# Patient Record
Sex: FEMALE | Race: WHITE | NOT HISPANIC OR LATINO | URBAN - METROPOLITAN AREA
[De-identification: names, ages, dates, MRNs, and addresses within clinical notes are randomized per-mention and may not be internally consistent; named-entity substitution may affect disease eponyms.]

---

## 2017-09-29 ENCOUNTER — TRANSFERRED RECORDS (OUTPATIENT)
Dept: HEALTH INFORMATION MANAGEMENT | Facility: CLINIC | Age: 72
End: 2017-09-29

## 2017-10-13 ENCOUNTER — OFFICE VISIT (OUTPATIENT)
Dept: OPHTHALMOLOGY | Facility: CLINIC | Age: 72
End: 2017-10-13

## 2017-10-13 DIAGNOSIS — H40.51X3 NVG (NEOVASCULAR GLAUCOMA), RIGHT, SEVERE STAGE: Primary | ICD-10-CM

## 2017-10-13 DIAGNOSIS — Z96.1 PSEUDOPHAKIA OF BOTH EYES: ICD-10-CM

## 2017-10-13 DIAGNOSIS — H20.9 UVEITIS: ICD-10-CM

## 2017-10-13 RX ORDER — PREDNISOLONE ACETATE 10 MG/ML
1 SUSPENSION/ DROPS OPHTHALMIC 4 TIMES DAILY
Qty: 1 BOTTLE | Refills: 3 | Status: SHIPPED | OUTPATIENT
Start: 2017-10-13

## 2017-10-13 RX ORDER — SIMVASTATIN 40 MG
40 TABLET ORAL
COMMUNITY
Start: 2017-08-21

## 2017-10-13 RX ORDER — DORZOLAMIDE HYDROCHLORIDE AND TIMOLOL MALEATE 20; 5 MG/ML; MG/ML
1 SOLUTION/ DROPS OPHTHALMIC 2 TIMES DAILY
Qty: 1 BOTTLE | Refills: 11 | Status: SHIPPED | OUTPATIENT
Start: 2017-10-13

## 2017-10-13 RX ORDER — LORAZEPAM 0.5 MG/1
0.5 TABLET ORAL
COMMUNITY
Start: 2016-02-17

## 2017-10-13 RX ORDER — BRIMONIDINE TARTRATE 2 MG/ML
1 SOLUTION/ DROPS OPHTHALMIC 3 TIMES DAILY
Qty: 15 ML | Refills: 11 | Status: SHIPPED | OUTPATIENT
Start: 2017-10-13

## 2017-10-13 RX ORDER — GLIPIZIDE 10 MG/1
10 TABLET, FILM COATED, EXTENDED RELEASE ORAL
COMMUNITY
Start: 2017-06-12

## 2017-10-13 RX ORDER — LATANOPROST 50 UG/ML
1 SOLUTION/ DROPS OPHTHALMIC AT BEDTIME
Qty: 1 BOTTLE | Refills: 11 | Status: SHIPPED | OUTPATIENT
Start: 2017-10-13

## 2017-10-13 RX ORDER — LISINOPRIL 2.5 MG/1
TABLET ORAL
COMMUNITY
Start: 2017-09-01

## 2017-10-13 RX ORDER — PREDNISOLONE ACETATE 10 MG/ML
1 SUSPENSION/ DROPS OPHTHALMIC 2 TIMES DAILY
COMMUNITY
Start: 2017-10-06

## 2017-10-13 ASSESSMENT — SLIT LAMP EXAM - LIDS
COMMENTS: NORMAL
COMMENTS: NORMAL

## 2017-10-13 ASSESSMENT — VISUAL ACUITY
OD_CC: 20/125-
METHOD: SNELLEN - LINEAR
OS_CC: 20/30-2

## 2017-10-13 ASSESSMENT — EXTERNAL EXAM - RIGHT EYE: OD_EXAM: NORMAL

## 2017-10-13 ASSESSMENT — TONOMETRY
IOP_METHOD: APPLANATION
OS_IOP_MMHG: 18
OD_IOP_MMHG: 19

## 2017-10-13 ASSESSMENT — EXTERNAL EXAM - LEFT EYE: OS_EXAM: NORMAL

## 2017-10-13 ASSESSMENT — CONF VISUAL FIELD
OD_SUPERIOR_NASAL_RESTRICTION: 1
OD_INFERIOR_TEMPORAL_RESTRICTION: 3
OS_NORMAL: 1

## 2017-10-13 ASSESSMENT — CUP TO DISC RATIO
OS_RATIO: 0.7
OD_RATIO: 0.9

## 2017-10-13 NOTE — PROGRESS NOTES
1)NVG OD -- s/p Ahmed Valve OD (9/28/15), Resolving DH OD (temp margin) 10/17 -- K pachy:623/622 Tmax: 48/22 HVF: GVF:OD:Mild constriction and OS:grossly WNL at HP and HVF OD:dec MD with Sup arcuate and Inf NS and OS:Nasal dec sens CDR:0.9/0.7 (very shallow and indistinct cupping OD) HRT/OCT: OD:Mod RNFL thinning (poor tracing) and OS:RNFL WNL FHX of Glc: Grandmother -- possibly Gonio: open Intolerant to: Asthma/COPD: No, tolerating topical BB Steroid Use: No Kidney Stones: No Sulfa Allergy: No IOP targets:OD:Hteens (pending further workup) and OS:L20s -- IOP borderline -- does better with GVF (consider using if LVC not helpful)  2)DM c PDR s/p PRP and Avastin -- following with Dr. 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 Edwards  -- has been on topical Pred for 3 weeks -- tube appears WC without any leaks evident --  Pt states that eye was inflammed and red for approx 1 month prior to presenting to Dr. Edwards with an IOP:30 -- ?etiology of prog OD -- consider referral to Dr. Ribera    Will obtain old Hollis visual field tests for comparison.  Patient will continue on Cosopt (Timolol/Dorzolamide) which is a blue top drop 2x/day (12 hours apart) in the right eye and Latanoprost which is a teal top drop at bedtime in the right eye and Alphagan (Brimonidine) which is a purple top drop 2x/day (12 hours apart) in the right eye.  Patient will also continue on Prednisolone 4x/day in the right eye and continue to follow up with Dr. Thurston and Dr. Edwards and return to clinic sooner for elevated IOPs.  Patient will return to clinic in 4-6 months with repeat visual field test (OD:LVC only) and IOP check.      Attending Physician Attestation:  Complete documentation of historical and exam elements from today's encounter can be found in the full encounter summary report (not reduplicated in this progress note). I personally obtained  the chief complaint(s) and history of present illness.  I confirmed and edited as necessary the review of systems, past medical/surgical history, family history, social history, and examination findings as documented by others; and I examined the patient myself. I personally reviewed the relevant tests, images, and reports as documented above. I formulated and edited as necessary the assessment and plan and discussed the findings and management plan with the patient and family.  - Norma Gale MD

## 2017-10-13 NOTE — NURSING NOTE
Chief Complaints and History of Present Illnesses   Patient presents with     Glaucoma Follow Up     pt referred back to Dr. Gale for glaucoma management by [facility]      HPI    Affected eye(s):  Right   Symptoms:     No decreased vision   No redness   No Dryness   No itching   No burning      Frequency:  Constant       Do you have eye pain now?:  No      Comments:  Referred for NVG - was using Prednisolone RE every two hours over the summer, RE had been very inflamed and teary  Currently has a pulling sensation in the RE since she has reduced her prednisolone to twice daily (reduced a week ago) instead of Q2H  Pt has been using Prednisolone twice daily in RE - last used yesterday   Latanoprost RE nightly - last used @ 9:30pm  Dorz-Timolol twice daily RE - last used this morning at 6:00am  Brimonidine 3x/day RE - last used about 6:01am    Spaces drops out one minute between     JAZMINE Dent 10:32 AM 10/13/2017

## 2017-10-13 NOTE — MR AVS SNAPSHOT
After Visit Summary   10/13/2017    Reva Valenzuela    MRN: [id number]           Patient Information     Date Of Birth          1945        Visit Information        Provider Department      10/13/2017 11:00 AM Norma Gale MD [facility]        Today's Diagnoses     NVG (neovascular glaucoma), right, severe stage    -  1    Pseudophakia of both eyes        Uveitis          Care Instructions    Will obtain old Hollis visual field tests for comparison.  Patient will continue on Cosopt (Timolol/Dorzolamide) which is a blue top drop 2x/day (12 hours apart) in the right eye and Latanoprost which is a teal top drop at bedtime in the right eye and Alphagan (Brimonidine) which is a purple top drop 2x/day (12 hours apart) in the right eye.  Patient will also continue on Prednisolone 4x/day in the right eye and continue to follow up with Dr. Thurston and Dr. Edwards and return to clinic sooner for elevated IOPs.  Patient will return to clinic in 4-6 months with repeat visual field test (OD:LVC only) and IOP check.            Follow-ups after your visit        Who to contact     Please call your clinic at [phone] to:    Ask questions about your health    Make or cancel appointments    Discuss your medicines    Learn about your test results    Speak to your doctor   If you have compliments or concerns about an experience at your clinic, or if you wish to file a complaint, please contact [facility] Patient Relations at [phone] or email us at [email or web address]         Additional Information About Your Visit        Myfacepagehart Information     [employer] is an electronic gateway that provides easy, online access to your medical records. With [employer], you can request a clinic appointment, read your test results, renew a prescription or communicate with your care team.     To sign up for [employer] visit the website at  [email or web address]   You will be asked to enter the access code listed below, as well as some personal information. Please follow the directions to create your username and password.     Your access code is: [email or web address]  Expires: 2017  6:30 AM     Your access code will  in 90 days. If you need help or a new code, please contact your [facility] or call [phone] for assistance.        Care EveryWhere ID     This is your Care EveryWhere ID. This could be used by other organizations to access your [facility] medical records  [id number]         Blood Pressure from Last 3 Encounters:   No data found for BP    Weight from Last 3 Encounters:   No data found for Wt              Today, you had the following     No orders found for display         Today's Medication Changes          These changes are accurate as of: 10/13/17 12:00 PM.  If you have any questions, ask your nurse or doctor.               These medicines have changed or have updated prescriptions.        Dose/Directions    * brimonidine 0.2 % ophthalmic solution   Commonly known as:  ALPHAGAN   This may have changed:  Another medication with the same name was added. Make sure you understand how and when to take each.   Used for:  NVG (neovascular glaucoma), right, severe stage   Changed by:  Norma Gale MD        Dose:  1 drop   Place 1 drop into the right eye 3 times daily   Quantity:  15 mL   Refills:  11       * brimonidine 0.2 % ophthalmic solution   Commonly known as:  ALPHAGAN   This may have changed:  You were already taking a medication with the same name, and this prescription was added. Make sure you understand how and when to take each.   Used for:  NVG (neovascular glaucoma), right, severe stage   Changed by:  Norma Gale MD        Dose:  1 drop   Place 1 drop into the right eye 3 times daily   Quantity:  15 mL   Refills:  11       * dorzolamide-timolol 2-0.5 % ophthalmic  solution   Commonly known as:  COSOPT   This may have changed:  Another medication with the same name was removed. Continue taking this medication, and follow the directions you see here.   Used for:  NVG (neovascular glaucoma), right, severe stage   Changed by:  Norma Gale MD        Dose:  1 drop   Place 1 drop into the right eye 2 times daily   Quantity:  1 Bottle   Refills:  11       * dorzolamide-timolol 2-0.5 % ophthalmic solution   Commonly known as:  COSOPT   This may have changed:  Another medication with the same name was removed. Continue taking this medication, and follow the directions you see here.   Used for:  NVG (neovascular glaucoma), right, severe stage   Changed by:  Norma Gale MD        Dose:  1 drop   Place 1 drop into the right eye 2 times daily   Quantity:  1 Bottle   Refills:  11       glipiZIDE 10 MG 24 hr tablet   Commonly known as:  GLUCOTROL XL   This may have changed:  Another medication with the same name was removed. Continue taking this medication, and follow the directions you see here.   Changed by:  Norma Gale MD        Dose:  10 mg   Take 10 mg by mouth   Refills:  0       * latanoprost 0.005 % ophthalmic solution   Commonly known as:  XALATAN   This may have changed:  Another medication with the same name was removed. Continue taking this medication, and follow the directions you see here.   Used for:  NVG (neovascular glaucoma), right, severe stage   Changed by:  Norma Gale MD        Dose:  1 drop   Place 1 drop into the right eye At Bedtime   Quantity:  1 Bottle   Refills:  11       * latanoprost 0.005 % ophthalmic solution   Commonly known as:  XALATAN   This may have changed:  Another medication with the same name was removed. Continue taking this medication, and follow the directions you see here.   Used for:  NVG (neovascular glaucoma), right, severe stage   Changed by:  Norma Gale MD        Dose:  1 drop   Place 1 drop into  the right eye At Bedtime   Quantity:  1 Bottle   Refills:  11       metFORMIN 850 MG tablet   Commonly known as:  GLUCOPHAGE   This may have changed:  Another medication with the same name was removed. Continue taking this medication, and follow the directions you see here.   Changed by:  Norma Gale MD        Dose:  850 mg   Take 850 mg by mouth   Refills:  0       * prednisoLONE acetate 1 % ophthalmic susp   Commonly known as:  PRED FORTE   This may have changed:  Another medication with the same name was added. Make sure you understand how and when to take each.   Changed by:  Norma Gale MD        Dose:  1 drop   Place 1 drop into the right eye 2 times daily   Refills:  0       * prednisoLONE acetate 1 % ophthalmic susp   Commonly known as:  PRED FORTE   This may have changed:  You were already taking a medication with the same name, and this prescription was added. Make sure you understand how and when to take each.   Used for:  NVG (neovascular glaucoma), right, severe stage   Changed by:  Norma Gale MD        Dose:  1 drop   Place 1 drop into the right eye 4 times daily   Quantity:  1 Bottle   Refills:  3       simvastatin 40 MG tablet   Commonly known as:  ZOCOR   This may have changed:  Another medication with the same name was removed. Continue taking this medication, and follow the directions you see here.   Changed by:  Norma Gale MD        Dose:  40 mg   Take 40 mg by mouth   Refills:  0       sitagliptin 50 MG tablet   Commonly known as:  JANUVIA   This may have changed:  Another medication with the same name was removed. Continue taking this medication, and follow the directions you see here.   Changed by:  Norma Gale MD        Dose:  50 mg   Take 50 mg by mouth   Refills:  0       * Notice:  This list has 8 medication(s) that are the same as other medications prescribed for you. Read the directions carefully, and ask your doctor or other care provider  "to review them with you.      Stop taking these medicines if you haven't already. Please contact your care team if you have questions.     ACCU-CHEK BOO test strip   Generic drug:  blood glucose monitoring   Stopped by:  Norma Gale MD           acetaZOLAMIDE 250 MG tablet   Commonly known as:  DIAMOX   Stopped by:  Norma Gale MD           blood glucose calibration solution   Stopped by:  Norma Gale MD           blood glucose monitoring lancets   Stopped by:  Norma Gale MD           chlorthalidone 25 MG tablet   Commonly known as:  HYGROTON   Stopped by:  Norma Gale MD           LANTUS SOLOSTAR 100 UNIT/ML injection   Generic drug:  insulin glargine   Stopped by:  Norma Gale MD           pen needles 5/16\" 31G X 8 MM Misc   Stopped by:  Norma Gale MD                Where to get your medicines      These medications were sent to [facility] [place], [place] - [address], [place] [address]     Phone:  [phone]     brimonidine 0.2 % ophthalmic solution    dorzolamide-timolol 2-0.5 % ophthalmic solution    latanoprost 0.005 % ophthalmic solution    prednisoLONE acetate 1 % ophthalmic susp                Primary Care Provider Office Phone # Fax #    Ivette Mirza [phone]       [address] [address]        Equal Access to Services     [facility] AH: Hadii aad ku hadasho Soomaali, waaxda luqadaha, qaybta kaalmada adeegyada, ernie humphriesin hayaan georgina santana . So [facility] [place] [phone].    ATENCIÓN: Si habla español, tiene a mccloud disposición servicios gratuitos de asistencia lingüística. Llame al [phone].    We comply with applicable federal civil rights laws and [place] laws. We do not discriminate on the basis of race, color, national origin, age, disability, sex, sexual orientation, or gender identity.            Thank you!     Thank you for choosing " [facility]  for your care. Our goal is always to provide you with excellent care. Hearing back from our patients is one way we can continue to improve our services. Please take a few minutes to complete the written survey that you may receive in the mail after your visit with us. Thank you!             Your Updated Medication List - Protect others around you: Learn how to safely use, store and throw away your medicines at www.disposemymeds.org.          This list is accurate as of: 10/13/17 12:00 PM.  Always use your most recent med list.                   Brand Name Dispense Instructions for use Diagnosis    aspirin 81 MG EC tablet      Take 81 mg by mouth        * brimonidine 0.2 % ophthalmic solution    ALPHAGAN    15 mL    Place 1 drop into the right eye 3 times daily    NVG (neovascular glaucoma), right, severe stage       * brimonidine 0.2 % ophthalmic solution    ALPHAGAN    15 mL    Place 1 drop into the right eye 3 times daily    NVG (neovascular glaucoma), right, severe stage       * dorzolamide-timolol 2-0.5 % ophthalmic solution    COSOPT    1 Bottle    Place 1 drop into the right eye 2 times daily    NVG (neovascular glaucoma), right, severe stage       * dorzolamide-timolol 2-0.5 % ophthalmic solution    COSOPT    1 Bottle    Place 1 drop into the right eye 2 times daily    NVG (neovascular glaucoma), right, severe stage       glipiZIDE 10 MG 24 hr tablet    GLUCOTROL XL     Take 10 mg by mouth        * latanoprost 0.005 % ophthalmic solution    XALATAN    1 Bottle    Place 1 drop into the right eye At Bedtime    NVG (neovascular glaucoma), right, severe stage       * latanoprost 0.005 % ophthalmic solution    XALATAN    1 Bottle    Place 1 drop into the right eye At Bedtime    NVG (neovascular glaucoma), right, severe stage       lisinopril 2.5 MG tablet    PRINIVIL/Zestril     TAKE ONE TABLET BY MOUTH EVERY DAY        LORazepam 0.5 MG tablet    ATIVAN     Take 0.5 mg by  mouth        metFORMIN 850 MG tablet    GLUCOPHAGE     Take 850 mg by mouth        * prednisoLONE acetate 1 % ophthalmic susp    PRED FORTE     Place 1 drop into the right eye 2 times daily        * prednisoLONE acetate 1 % ophthalmic susp    PRED FORTE    1 Bottle    Place 1 drop into the right eye 4 times daily    NVG (neovascular glaucoma), right, severe stage       simvastatin 40 MG tablet    ZOCOR     Take 40 mg by mouth        sitagliptin 50 MG tablet    JANUVIA     Take 50 mg by mouth        * Notice:  This list has 8 medication(s) that are the same as other medications prescribed for you. Read the directions carefully, and ask your doctor or other care provider to review them with you.

## 2018-08-10 ENCOUNTER — RECORDS - HEALTHEAST (OUTPATIENT)
Dept: ADMINISTRATIVE | Facility: OTHER | Age: 73
End: 2018-08-10